# Patient Record
Sex: FEMALE | Race: WHITE | Employment: UNEMPLOYED | URBAN - METROPOLITAN AREA
[De-identification: names, ages, dates, MRNs, and addresses within clinical notes are randomized per-mention and may not be internally consistent; named-entity substitution may affect disease eponyms.]

---

## 2022-09-19 ENCOUNTER — HOSPITAL ENCOUNTER (EMERGENCY)
Age: 52
Discharge: ANOTHER HOSPITAL | End: 2022-09-20
Attending: STUDENT IN AN ORGANIZED HEALTH CARE EDUCATION/TRAINING PROGRAM
Payer: COMMERCIAL

## 2022-09-19 ENCOUNTER — APPOINTMENT (OUTPATIENT)
Dept: GENERAL RADIOLOGY | Age: 52
End: 2022-09-19
Payer: COMMERCIAL

## 2022-09-19 DIAGNOSIS — R73.9 HYPERGLYCEMIA: ICD-10-CM

## 2022-09-19 DIAGNOSIS — L02.511 ABSCESS OF RIGHT INDEX FINGER: Primary | ICD-10-CM

## 2022-09-19 LAB
A/G RATIO: 1 (ref 1.1–2.2)
ALBUMIN SERPL-MCNC: 4 G/DL (ref 3.4–5)
ALP BLD-CCNC: 177 U/L (ref 40–129)
ALT SERPL-CCNC: 27 U/L (ref 10–40)
ANION GAP SERPL CALCULATED.3IONS-SCNC: 12 MMOL/L (ref 3–16)
AST SERPL-CCNC: 23 U/L (ref 15–37)
BASOPHILS ABSOLUTE: 0.1 K/UL (ref 0–0.2)
BASOPHILS RELATIVE PERCENT: 0.6 %
BILIRUB SERPL-MCNC: <0.2 MG/DL (ref 0–1)
BUN BLDV-MCNC: 16 MG/DL (ref 7–20)
C-REACTIVE PROTEIN: 72 MG/L (ref 0–5.1)
CALCIUM SERPL-MCNC: 9.8 MG/DL (ref 8.3–10.6)
CHLORIDE BLD-SCNC: 94 MMOL/L (ref 99–110)
CO2: 26 MMOL/L (ref 21–32)
CREAT SERPL-MCNC: 0.6 MG/DL (ref 0.6–1.1)
EOSINOPHILS ABSOLUTE: 0.2 K/UL (ref 0–0.6)
EOSINOPHILS RELATIVE PERCENT: 1.3 %
GFR AFRICAN AMERICAN: >60
GFR NON-AFRICAN AMERICAN: >60
GLUCOSE BLD-MCNC: 415 MG/DL (ref 70–99)
HCT VFR BLD CALC: 45.6 % (ref 36–48)
HEMOGLOBIN: 15.9 G/DL (ref 12–16)
LACTIC ACID: 1.1 MMOL/L (ref 0.4–2)
LYMPHOCYTES ABSOLUTE: 1.6 K/UL (ref 1–5.1)
LYMPHOCYTES RELATIVE PERCENT: 12.6 %
MCH RBC QN AUTO: 30 PG (ref 26–34)
MCHC RBC AUTO-ENTMCNC: 34.8 G/DL (ref 31–36)
MCV RBC AUTO: 86.4 FL (ref 80–100)
MONOCYTES ABSOLUTE: 0.9 K/UL (ref 0–1.3)
MONOCYTES RELATIVE PERCENT: 7.3 %
NEUTROPHILS ABSOLUTE: 9.7 K/UL (ref 1.7–7.7)
NEUTROPHILS RELATIVE PERCENT: 78.2 %
PDW BLD-RTO: 13 % (ref 12.4–15.4)
PLATELET # BLD: 367 K/UL (ref 135–450)
PMV BLD AUTO: 6.8 FL (ref 5–10.5)
POTASSIUM REFLEX MAGNESIUM: 4.4 MMOL/L (ref 3.5–5.1)
RBC # BLD: 5.28 M/UL (ref 4–5.2)
SEDIMENTATION RATE, ERYTHROCYTE: 83 MM/HR (ref 0–30)
SODIUM BLD-SCNC: 132 MMOL/L (ref 136–145)
TOTAL PROTEIN: 8.2 G/DL (ref 6.4–8.2)
WBC # BLD: 12.4 K/UL (ref 4–11)

## 2022-09-19 PROCEDURE — 87040 BLOOD CULTURE FOR BACTERIA: CPT

## 2022-09-19 PROCEDURE — 96365 THER/PROPH/DIAG IV INF INIT: CPT

## 2022-09-19 PROCEDURE — 96366 THER/PROPH/DIAG IV INF ADDON: CPT

## 2022-09-19 PROCEDURE — 73140 X-RAY EXAM OF FINGER(S): CPT

## 2022-09-19 PROCEDURE — 86140 C-REACTIVE PROTEIN: CPT

## 2022-09-19 PROCEDURE — 85025 COMPLETE CBC W/AUTO DIFF WBC: CPT

## 2022-09-19 PROCEDURE — 96367 TX/PROPH/DG ADDL SEQ IV INF: CPT

## 2022-09-19 PROCEDURE — 6360000002 HC RX W HCPCS: Performed by: PHYSICIAN ASSISTANT

## 2022-09-19 PROCEDURE — 99285 EMERGENCY DEPT VISIT HI MDM: CPT

## 2022-09-19 PROCEDURE — 2580000003 HC RX 258: Performed by: PHYSICIAN ASSISTANT

## 2022-09-19 PROCEDURE — 36415 COLL VENOUS BLD VENIPUNCTURE: CPT

## 2022-09-19 PROCEDURE — 80053 COMPREHEN METABOLIC PANEL: CPT

## 2022-09-19 PROCEDURE — 96375 TX/PRO/DX INJ NEW DRUG ADDON: CPT

## 2022-09-19 PROCEDURE — 83605 ASSAY OF LACTIC ACID: CPT

## 2022-09-19 PROCEDURE — 96376 TX/PRO/DX INJ SAME DRUG ADON: CPT

## 2022-09-19 PROCEDURE — 85652 RBC SED RATE AUTOMATED: CPT

## 2022-09-19 RX ORDER — MORPHINE SULFATE 2 MG/ML
2 INJECTION, SOLUTION INTRAMUSCULAR; INTRAVENOUS ONCE
Status: COMPLETED | OUTPATIENT
Start: 2022-09-19 | End: 2022-09-19

## 2022-09-19 RX ORDER — HYDROCODONE BITARTRATE AND ACETAMINOPHEN 5; 325 MG/1; MG/1
1 TABLET ORAL EVERY 6 HOURS PRN
COMMUNITY

## 2022-09-19 RX ORDER — MORPHINE SULFATE 4 MG/ML
4 INJECTION, SOLUTION INTRAMUSCULAR; INTRAVENOUS ONCE
Status: COMPLETED | OUTPATIENT
Start: 2022-09-19 | End: 2022-09-19

## 2022-09-19 RX ORDER — ONDANSETRON 2 MG/ML
4 INJECTION INTRAMUSCULAR; INTRAVENOUS PRN
Status: COMPLETED | OUTPATIENT
Start: 2022-09-19 | End: 2022-09-19

## 2022-09-19 RX ORDER — 0.9 % SODIUM CHLORIDE 0.9 %
1000 INTRAVENOUS SOLUTION INTRAVENOUS ONCE
Status: COMPLETED | OUTPATIENT
Start: 2022-09-19 | End: 2022-09-19

## 2022-09-19 RX ORDER — DOXYCYCLINE HYCLATE 100 MG/1
100 CAPSULE ORAL 2 TIMES DAILY
COMMUNITY

## 2022-09-19 RX ADMIN — ONDANSETRON HYDROCHLORIDE 4 MG: 2 INJECTION, SOLUTION INTRAMUSCULAR; INTRAVENOUS at 22:23

## 2022-09-19 RX ADMIN — MORPHINE SULFATE 4 MG: 4 INJECTION, SOLUTION INTRAMUSCULAR; INTRAVENOUS at 22:23

## 2022-09-19 RX ADMIN — CEFTRIAXONE SODIUM 1000 MG: 1 INJECTION, POWDER, FOR SOLUTION INTRAMUSCULAR; INTRAVENOUS at 22:22

## 2022-09-19 RX ADMIN — VANCOMYCIN HYDROCHLORIDE 2000 MG: 1 INJECTION, POWDER, LYOPHILIZED, FOR SOLUTION INTRAVENOUS at 22:45

## 2022-09-19 RX ADMIN — MORPHINE SULFATE 2 MG: 2 INJECTION, SOLUTION INTRAMUSCULAR; INTRAVENOUS at 23:10

## 2022-09-19 RX ADMIN — SODIUM CHLORIDE 1000 ML: 9 INJECTION, SOLUTION INTRAVENOUS at 22:22

## 2022-09-19 ASSESSMENT — PAIN DESCRIPTION - PAIN TYPE: TYPE: ACUTE PAIN

## 2022-09-19 ASSESSMENT — PAIN DESCRIPTION - ONSET: ONSET: PROGRESSIVE

## 2022-09-19 ASSESSMENT — PAIN - FUNCTIONAL ASSESSMENT: PAIN_FUNCTIONAL_ASSESSMENT: 0-10

## 2022-09-19 ASSESSMENT — PAIN DESCRIPTION - ORIENTATION
ORIENTATION: RIGHT

## 2022-09-19 ASSESSMENT — PAIN DESCRIPTION - FREQUENCY: FREQUENCY: CONTINUOUS

## 2022-09-19 ASSESSMENT — PAIN SCALES - GENERAL
PAINLEVEL_OUTOF10: 7
PAINLEVEL_OUTOF10: 7
PAINLEVEL_OUTOF10: 4

## 2022-09-19 ASSESSMENT — PAIN DESCRIPTION - LOCATION
LOCATION: FINGER (COMMENT WHICH ONE)

## 2022-09-19 ASSESSMENT — PAIN DESCRIPTION - DESCRIPTORS: DESCRIPTORS: THROBBING

## 2022-09-20 VITALS
DIASTOLIC BLOOD PRESSURE: 71 MMHG | OXYGEN SATURATION: 92 % | BODY MASS INDEX: 28.63 KG/M2 | HEIGHT: 70 IN | RESPIRATION RATE: 16 BRPM | SYSTOLIC BLOOD PRESSURE: 141 MMHG | TEMPERATURE: 98.3 F | WEIGHT: 200 LBS | HEART RATE: 85 BPM

## 2022-09-20 PROCEDURE — 6360000002 HC RX W HCPCS: Performed by: PHYSICIAN ASSISTANT

## 2022-09-20 RX ADMIN — HYDROMORPHONE HYDROCHLORIDE 0.5 MG: 1 INJECTION, SOLUTION INTRAMUSCULAR; INTRAVENOUS; SUBCUTANEOUS at 01:13

## 2022-09-20 ASSESSMENT — PAIN SCALES - GENERAL: PAINLEVEL_OUTOF10: 5

## 2022-09-20 ASSESSMENT — PAIN DESCRIPTION - ORIENTATION: ORIENTATION: RIGHT

## 2022-09-20 ASSESSMENT — PAIN DESCRIPTION - LOCATION: LOCATION: FINGER (COMMENT WHICH ONE)

## 2022-09-20 NOTE — ED PROVIDER NOTES
[address] ED  EMERGENCY DEPARTMENT ENCOUNTER        Pt Name: Nelida Pal  MRN: [id number]  [place] 1970  Date of evaluation: 9/19/2022  Provider: SANG Devries  PCP: No primary care provider on file. This patient was seen and evaluated by the attending physician Michael Caldwell MD.      [address] [facility]       Chief Complaint   Patient presents with    Other     Pt states since last Friday has had signs of infection in right index finer. Writer notes swelling, redness, black coloration at tip of finger, discharge. Pt states painful. HISTORY OF PRESENT ILLNESS   (Location/Symptom, Timing/Onset, Context/Setting, Quality, Duration, Modifying Factors, Severity)  Note limiting factors. Nelida Pal is a 50 y.o. female with past medical history of rheumatoid arthritis not currently on any medication who presents via private vehicle from her home for evaluation of right index finger pain and swelling and infection. Patient notes that she started with some redness and warmth and suspected infection around the right index finger on September 9. She notes that the infection is just gotten progressively worse since it started. She presented to an outside urgent care this past Saturday and had localized incision and drainage and was started on doxycycline. Patient notes that ever since then she has had significantly progressively worsening swelling redness warmth and she notes that the tip of her finger is dark in color. Patient denies any fevers body aches chills nausea vomiting. She endorses significant pain to the finger, 10 out of 10. She is right-hand dominant. Denies past medical history of diabetes or other forms of immunocompromise. She works as a . Nursing Notes were all reviewed and agreed with or any disagreements were addressed  in the HPI. Pt was seen during the [place] 19 pandemic.  Appropriate PPE worn by ME during patient encounters. Pt seen during a time with constrained hospital bed capacity and other potential inpatient and outpatient resources were constrained due to the viral pandemic. REVIEW OF SYSTEMS    (2-9 systems for level 4, 10 or more for level 5)     Review of Systems    Positives and Pertinent negatives as per HPI. Except as noted abovein the ROS, all other systems were reviewed and negative. PAST MEDICAL HISTORY   History reviewed. No pertinent past medical history. SURGICAL HISTORY     Past Surgical History:   Procedure Laterality Date    APPENDECTOMY      CHOLECYSTECTOMY           CURRENTMEDICATIONS       Previous Medications    DOXYCYCLINE HYCLATE (VIBRAMYCIN) 100 MG CAPSULE    Take 100 mg by mouth 2 times daily    HYDROCODONE-ACETAMINOPHEN (NORCO) 5-325 MG PER TABLET    Take 1 tablet by mouth every 6 hours as needed for Pain. ALLERGIES     Penicillins    FAMILYHISTORY     History reviewed. No pertinent family history. SOCIAL HISTORY       Social History     Socioeconomic History    Marital status:      Spouse name: None    Number of children: None    Years of education: None    Highest education level: None   Tobacco Use    Smoking status: Every Day     Packs/day: 1.00     Types: Cigarettes    Smokeless tobacco: Never   Substance and Sexual Activity    Alcohol use: Not Currently    Drug use: Not Currently       SCREENINGS    Leida Coma Scale  Eye Opening: Spontaneous  Best Verbal Response: Oriented  Best Motor Response: Obeys commands  Leida Coma Scale Score: 15        PHYSICAL EXAM    (up to 7 for level 4, 8 or more for level 5)     ED Triage Vitals [09/19/22 2104]   BP Temp Temp Source Heart Rate Resp SpO2 Height Weight   (!) 178/100 98.3 °F (36.8 °C) Oral (!) 103 16 98 % 5' 10\" (1.778 m) 200 lb (90.7 kg)       Physical Exam  Vitals and nursing note reviewed. Constitutional:       General: She is awake. She is in acute distress. Appearance: She is well-developed. She is not ill-appearing, toxic-appearing or diaphoretic. HENT:      Head: Normocephalic and atraumatic. Right Ear: External ear normal.      Left Ear: External ear normal.      Nose: Nose normal.      Mouth/Throat:      Mouth: Mucous membranes are moist.      Pharynx: Oropharynx is clear. Eyes:      General:         Right eye: No discharge. Left eye: No discharge. Extraocular Movements: Extraocular movements intact. Conjunctiva/sclera: Conjunctivae normal.      Pupils: Pupils are equal, round, and reactive to light. Cardiovascular:      Rate and Rhythm: Regular rhythm. Tachycardia present. Pulses: Normal pulses. Heart sounds: Normal heart sounds. No murmur heard. No friction rub. No gallop. Pulmonary:      Effort: Pulmonary effort is normal. No respiratory distress. Breath sounds: Normal breath sounds. No wheezing or rales. Musculoskeletal:      Cervical back: Normal range of motion and neck supple. No rigidity. Comments: Inspection of the right upper extremity reveals significant soft tissue deformity, there is circumferential swelling of the right index finger that extends from the middle of the fingernail through the DIP and PIP and all through the proximal phalanx. Patient is in a fixed extended position, she is able to partially flex at the MCP no more than 45 degrees. Patient has no active range of motion at the PIP or DIP. The very tip of the fingertip is black, with necrosis and it is cool to touch with decreased sensation. There is mild lymphangitic streaking down the palmar aspect of the hand. Lymphadenopathy:      Cervical: No cervical adenopathy. Skin:     General: Skin is warm and dry. Capillary Refill: Capillary refill takes 2 to 3 seconds. Neurological:      General: No focal deficit present. Mental Status: She is alert, oriented to person, place, and time and easily aroused. Cranial Nerves: No cranial nerve deficit. Psychiatric:         Behavior: Behavior normal. Behavior is cooperative. DIAGNOSTIC RESULTS   LABS:    Labs Reviewed   CBC WITH AUTO DIFFERENTIAL - Abnormal; Notable for the following components:       Result Value    WBC 12.4 (*)     RBC 5.28 (*)     Neutrophils Absolute 9.7 (*)     All other components within normal limits   COMPREHENSIVE METABOLIC PANEL W/ REFLEX TO MG FOR LOW K - Abnormal; Notable for the following components:    Sodium 132 (*)     Chloride 94 (*)     Glucose 415 (*)     Albumin/Globulin Ratio 1.0 (*)     Alkaline Phosphatase 177 (*)     All other components within normal limits   SEDIMENTATION RATE - Abnormal; Notable for the following components:    Sed Rate 83 (*)     All other components within normal limits   C-REACTIVE PROTEIN - Abnormal; Notable for the following components:    CRP 72.0 (*)     All other components within normal limits   CULTURE, BLOOD 1   CULTURE, BLOOD 2   LACTIC ACID       All other labs were within normal range or not returned as of this dictation. EKG: All EKG's are interpreted by the Emergency Department Physician who either signs orCo-signs this chart in the absence of a cardiologist.  Please see their note for interpretation of EKG. RADIOLOGY:   Non-plain film images such as CT, Ultrasound and MRI are read by the radiologist. Plain radiographic images are visualized andpreliminarily interpreted by the  ED Provider with the below findings:        Interpretation perthe Radiologist below, if available at the time of this note:    XR FINGER RIGHT (MIN 2 VIEWS)   Final Result   Soft tissue swelling though no soft tissue gas, foreign body or osseous   fracture. No results found. PROCEDURES   Unless otherwise noted below, none     Procedures    CRITICAL CARE TIME   I personally saw the patient and independently provided 46 minutes of non-concurrent critical care out of the total shared critical care time provided.  This excludes time spent doing separately billable procedures. This includes time at the bedside, data interpretation, medication management, obtaining critical history from collateral sources if the patient is unable to provide it directly, and physician consultation. Specifics of interventions taken and potentially life-threatening diagnostic considerations are listed above in the medical decision making. If this was a shared visit with a Physician the time in this attestation is non-concurrent critical care time out of the total shared critical care time provided by the Physician and myself. CONSULTS:  IP CONSULT TO ORTHOPEDIC SURGERY      EMERGENCY DEPARTMENT COURSE and DIFFERENTIALDIAGNOSIS/MDM:   Vitals:    Vitals:    09/19/22 2107 09/19/22 2225 09/19/22 2239 09/20/22 0002   BP:  (!) 162/88 (!) 162/88 (!) 149/82   Pulse: 99  89    Resp:   16    Temp:       TempSrc:       SpO2:  98% 96% 95%   Weight:       Height:           Patient was given thefollowing medications:  Medications   vancomycin (VANCOCIN) 2,000 mg in dextrose 5 % 500 mL IVPB (2,000 mg IntraVENous New Bag 9/19/22 2245)   cefTRIAXone (ROCEPHIN) 1,000 mg in dextrose 5 % 50 mL IVPB mini-bag (0 mg IntraVENous Stopped 9/19/22 2245)   0.9 % sodium chloride bolus (0 mLs IntraVENous Stopped 9/19/22 2353)   morphine sulfate (PF) injection 4 mg (4 mg IntraVENous Given 9/19/22 2223)   ondansetron (ZOFRAN) injection 4 mg (4 mg IntraVENous Given 9/19/22 2223)   morphine (PF) injection 2 mg (2 mg IntraVENous Given 9/19/22 2310)       PDMP Monitoring:    Last PDMP Pop as Reviewed [facility]):  Review User Review Instant Review Result            Urine Drug Screenings (1 yr)    No resulted procedures found. Medication Contract and Consent for Opioid Use Documents Filed        No documents found                    MDM:   Patient seen and evaluated. Old records reviewed. Diagnostic testing reviewed and results discussed.       Patient is a 82-year-old female who presents for evaluation of right index finger infection. Patient seen and evaluated by myself. Patient was promptly started on IV antibiotics, IV fluids, CBC CMP blood cultures and x-ray obtained. Orthopedics was consulted stat, per Dr. Joshua Zepeda patient will need to be transferred to facility with hand surgery coverage. Patient has acute leukocytosis, she has initial tachycardia she is meeting SIRS criteria however she has no acute lactic acidosis not meeting criteria for severe sepsis or septic shock. I believe that the initial tachycardia may have been pain related as patient is afebrile and other vital signs are reassuring in the department, I have no concern for acute septicemia or bacteremia. Patient has no sign of soft tissue gas in the department on x-ray. Patient has elevated inflammatory markers including sed rate and CRP. I spoke with Ortho hand who will see the patient in the ER at [facility]. Additionally, patient is found to be significantly hyperglycemic in the department however she has no anion gap I have no concern for DKA however patient may have underlying undiagnosed diabetes, recommend continued monitoring. Patient getting IV fluids in the department and is n.p.o., insulin not administered. I spoke with Dr. Everette Salas [facility] and Dr. Tylor Mendoza ER attending. We thoroughly discussed the history, physical exam, laboratory and imaging studies, as well as, current course of treatment within the emergency department. Based upon that discussion, we've decided to transfer Marielena Souza to [facility] ER, for further observation and evaluation of Alida Brice current condition. As I have deemed necessary from their history, physical, and studies, I have considered and evaluated Marielean Souza for the following diagnoses:      CLINICAL IMPRESSION:  1. Abscess of right index finger    2.  Hyperglycemia        BP (!) 149/82   Pulse 89   Temp 98.3 °F (36.8 °C) (Oral)   Resp 16   Ht 5' 10\" (1.778 m) Wt 200 lb (90.7 kg)   LMP 09/12/2022 (Approximate)   SpO2 95%   BMI 28.70 kg/m²         Is this patient to be included in the SEP-1 Core Measure due to severe sepsis or septic shock? No   Exclusion criteria - the patient is NOT to be included for SEP-1 Core Measure due to: Alternative explanation for abnormal labs/vitals that do not relate to sepsis, see MDM for further explanation        FINAL IMPRESSION      1. Abscess of right index finger    2. Hyperglycemia          DISPOSITION/PLAN   DISPOSITION Decision To Transfer 09/20/2022 12:54:37 AM      PATIENT REFERREDTO:  No follow-up provider specified.     DISCHARGE MEDICATIONS:  New Prescriptions    No medications on file       DISCONTINUED MEDICATIONS:  Discontinued Medications    No medications on file              (Please note that portions ofthis note were completed with a voice recognition program.  Efforts were made to edit the dictations but occasionally words are mis-transcribed.)    SANG Jung (electronically signed)       SANG Jung  09/20/22 [address]115 [address]  09/20/22 0107 [place]

## 2022-09-20 NOTE — ED NOTES
2231- Call to the [facility] Sreekanth Dominguez RN) requested to have [facility] paged     Opal Almanza  09/19/22 0604
3399  Hand surgery consult completed by Dr. Aravind Bradford  09/19/22 9161
Report given to Raghav Dunbar RN at [facility] ED at this time.       Sony Campbell RN  09/20/22 0811
mariam,girl

## 2022-09-23 LAB — BLOOD CULTURE, ROUTINE: NORMAL

## 2024-12-02 ENCOUNTER — HOSPITAL ENCOUNTER (EMERGENCY)
Age: 54
Discharge: HOME | End: 2024-12-02
Payer: COMMERCIAL

## 2024-12-02 ENCOUNTER — APPOINTMENT (OUTPATIENT)
Dept: GENERAL RADIOLOGY | Age: 54
End: 2024-12-02
Payer: COMMERCIAL

## 2024-12-02 VITALS
TEMPERATURE: 97.8 F | HEIGHT: 70 IN | DIASTOLIC BLOOD PRESSURE: 85 MMHG | WEIGHT: 215 LBS | RESPIRATION RATE: 16 BRPM | OXYGEN SATURATION: 97 % | SYSTOLIC BLOOD PRESSURE: 165 MMHG | BODY MASS INDEX: 30.78 KG/M2 | HEART RATE: 95 BPM

## 2024-12-02 DIAGNOSIS — L84 FOOT CALLUS: Primary | ICD-10-CM

## 2024-12-02 DIAGNOSIS — T14.8XXA BLOOD BLISTER: ICD-10-CM

## 2024-12-02 LAB
ALBUMIN SERPL-MCNC: 3.6 G/DL (ref 3.4–5)
ALBUMIN/GLOB SERPL: 1 {RATIO} (ref 1.1–2.2)
ALP SERPL-CCNC: 113 U/L (ref 40–129)
ALT SERPL-CCNC: 12 U/L (ref 10–40)
ANION GAP SERPL CALCULATED.3IONS-SCNC: 11 MMOL/L (ref 3–16)
AST SERPL-CCNC: 16 U/L (ref 15–37)
BASOPHILS # BLD: 0.1 K/UL (ref 0–0.2)
BASOPHILS NFR BLD: 1 %
BILIRUB SERPL-MCNC: <0.2 MG/DL (ref 0–1)
BUN SERPL-MCNC: 20 MG/DL (ref 7–20)
CALCIUM SERPL-MCNC: 9.6 MG/DL (ref 8.3–10.6)
CHLORIDE SERPL-SCNC: 100 MMOL/L (ref 99–110)
CO2 SERPL-SCNC: 26 MMOL/L (ref 21–32)
CREAT SERPL-MCNC: 0.7 MG/DL (ref 0.6–1.1)
DEPRECATED RDW RBC AUTO: 13.3 % (ref 12.4–15.4)
EOSINOPHIL # BLD: 0.3 K/UL (ref 0–0.6)
EOSINOPHIL NFR BLD: 3.5 %
GFR SERPLBLD CREATININE-BSD FMLA CKD-EPI: >90 ML/MIN/{1.73_M2}
GLUCOSE SERPL-MCNC: 250 MG/DL (ref 70–99)
HCT VFR BLD AUTO: 44 % (ref 36–48)
HGB BLD-MCNC: 15.6 G/DL (ref 12–16)
LACTATE BLDV-SCNC: 1.7 MMOL/L (ref 0.4–1.9)
LYMPHOCYTES # BLD: 2.7 K/UL (ref 1–5.1)
LYMPHOCYTES NFR BLD: 28.1 %
MCH RBC QN AUTO: 31.6 PG (ref 26–34)
MCHC RBC AUTO-ENTMCNC: 35.5 G/DL (ref 31–36)
MCV RBC AUTO: 89 FL (ref 80–100)
MONOCYTES # BLD: 0.5 K/UL (ref 0–1.3)
MONOCYTES NFR BLD: 5.8 %
NEUTROPHILS # BLD: 5.8 K/UL (ref 1.7–7.7)
NEUTROPHILS NFR BLD: 61.6 %
PLATELET # BLD AUTO: 242 K/UL (ref 135–450)
PMV BLD AUTO: 7.4 FL (ref 5–10.5)
POTASSIUM SERPL-SCNC: 4.2 MMOL/L (ref 3.5–5.1)
PROCALCITONIN SERPL IA-MCNC: 0.02 NG/ML (ref 0–0.15)
PROT SERPL-MCNC: 7.1 G/DL (ref 6.4–8.2)
RBC # BLD AUTO: 4.94 M/UL (ref 4–5.2)
SODIUM SERPL-SCNC: 137 MMOL/L (ref 136–145)
WBC # BLD AUTO: 9.5 K/UL (ref 4–11)

## 2024-12-02 PROCEDURE — 83605 ASSAY OF LACTIC ACID: CPT

## 2024-12-02 PROCEDURE — 99285 EMERGENCY DEPT VISIT HI MDM: CPT

## 2024-12-02 PROCEDURE — 87040 BLOOD CULTURE FOR BACTERIA: CPT

## 2024-12-02 PROCEDURE — 85025 COMPLETE CBC W/AUTO DIFF WBC: CPT

## 2024-12-02 PROCEDURE — 93005 ELECTROCARDIOGRAM TRACING: CPT

## 2024-12-02 PROCEDURE — 73630 X-RAY EXAM OF FOOT: CPT

## 2024-12-02 PROCEDURE — 71045 X-RAY EXAM CHEST 1 VIEW: CPT

## 2024-12-02 PROCEDURE — 84145 PROCALCITONIN (PCT): CPT

## 2024-12-02 PROCEDURE — 80053 COMPREHEN METABOLIC PANEL: CPT

## 2024-12-02 RX ORDER — ATORVASTATIN CALCIUM 20 MG/1
20 TABLET, FILM COATED ORAL NIGHTLY
COMMUNITY

## 2024-12-02 RX ORDER — METFORMIN HYDROCHLORIDE 500 MG/1
TABLET, EXTENDED RELEASE ORAL
COMMUNITY
Start: 2024-09-27

## 2024-12-02 RX ORDER — ACETAMINOPHEN 500 MG
1000 TABLET ORAL 4 TIMES DAILY PRN
Qty: 360 TABLET | Refills: 1 | Status: SHIPPED | OUTPATIENT
Start: 2024-12-02

## 2024-12-02 RX ORDER — NAPROXEN 500 MG/1
500 TABLET ORAL 2 TIMES DAILY WITH MEALS
Qty: 60 TABLET | Refills: 0 | Status: SHIPPED | OUTPATIENT
Start: 2024-12-02

## 2024-12-02 RX ORDER — DULAGLUTIDE 1.5 MG/.5ML
INJECTION, SOLUTION SUBCUTANEOUS
COMMUNITY
Start: 2024-09-26

## 2024-12-02 RX ORDER — MULTIVIT-MIN/FOLIC ACID/HRB293 80MCG-66.7
1 TABLET,CHEWABLE ORAL DAILY
COMMUNITY

## 2024-12-02 RX ORDER — CITALOPRAM HYDROBROMIDE 20 MG/1
20 TABLET ORAL DAILY
COMMUNITY
Start: 2024-09-27

## 2024-12-02 ASSESSMENT — LIFESTYLE VARIABLES
HOW OFTEN DO YOU HAVE A DRINK CONTAINING ALCOHOL: MONTHLY OR LESS
HOW MANY STANDARD DRINKS CONTAINING ALCOHOL DO YOU HAVE ON A TYPICAL DAY: 1 OR 2

## 2024-12-02 ASSESSMENT — ENCOUNTER SYMPTOMS
BACK PAIN: 0
NAUSEA: 0
ABDOMINAL PAIN: 0
VOMITING: 0

## 2024-12-02 ASSESSMENT — PAIN - FUNCTIONAL ASSESSMENT: PAIN_FUNCTIONAL_ASSESSMENT: NONE - DENIES PAIN

## 2024-12-02 NOTE — DISCHARGE INSTRUCTIONS
There is no evidence of bone infection/osteomyelitis on your x-ray.  There is no degenerative changes to the bones which would necessarily be concern for osteosarcoma.  There is no definitive abscess or cyst formation.  I did speak with podiatry regarding your emergency department visit.  They will see you in the clinic as an outpatient.  Your blood work does not show concerns for potential infection or believe that you need an antibiotic.    I did send extra strength Tylenol and naproxen to your pharmacy for management of pain.   and take as directed.  Contact number for podiatry listed above.    Follow-up with primary care as needed.    Please return if worse.

## 2024-12-02 NOTE — ED PROVIDER NOTES
[facility] ED  EMERGENCY DEPARTMENT ENCOUNTER        Pt Name: Alida Brice  MRN: [id number]  Birthdate 1970  Date of evaluation: 12/2/2024  Provider: SEFERINO Amaya CNP  PCP: No primary care provider on file.  Note Started: 4:14 PM EST 12/2/24      DEAN. I have evaluated this patient.        CHIEF COMPLAINT       Chief Complaint   Patient presents with    Wound Check     Patient states she thinks she has a blister on her right foot, patient is a diabetic. Patient states she discovered it on Wednesday        HISTORY OF PRESENT ILLNESS: 1 or more Elements     History From: Patient, EMR review    Chief Complaint: Wound to the right foot    Alida Brice is a 54 y.o. female who presents to the emergency department for evaluation of a wound to the bottom of the right foot.  States that she first noted this area of concern on Wednesday, November 25.  Has not appreciated any active bleeding or drainage.  Does have a history of type 2 diabetes but states that her blood sugars have been running appropriately.  Denies any subjective or objective fevers, chills, nausea, vomiting.  Not currently connected with podiatry.    Nursing Notes were all reviewed and agreed with or any disagreements were addressed in the HPI.    REVIEW OF SYSTEMS :      Review of Systems   Constitutional:  Negative for chills, fatigue and fever.   Gastrointestinal:  Negative for abdominal pain, nausea and vomiting.   Musculoskeletal:  Negative for back pain and myalgias.   Skin:  Positive for wound.   Allergic/Immunologic: Positive for immunocompromised state (T2DM).   Psychiatric/Behavioral:  Negative for confusion.    All other systems reviewed and are negative.      Positives and Pertinent negatives as per HPI.     SURGICAL HISTORY     Past Surgical History:   Procedure Laterality Date    APPENDECTOMY      CHOLECYSTECTOMY         CURRENTMEDICATIONS       Discharge Medication List as of 12/2/2024  5:14 PM

## 2024-12-02 NOTE — ED TRIAGE NOTES
Patient states she thinks she has a blister on her right foot, patient is a diabetic. Patient states she discovered it on Wednesday

## 2024-12-02 NOTE — ED NOTES
1619 - Perfect Serve sent to Dr. Acevedo.  1625 - Dr. Acevedo returned call, call given to Alejandrina Dozier NP

## 2024-12-03 LAB
EKG ATRIAL RATE: 92 BPM
EKG DIAGNOSIS: NORMAL
EKG P AXIS: 69 DEGREES
EKG P-R INTERVAL: 164 MS
EKG Q-T INTERVAL: 360 MS
EKG QRS DURATION: 92 MS
EKG QTC CALCULATION (BAZETT): 445 MS
EKG R AXIS: 5 DEGREES
EKG T AXIS: 47 DEGREES
EKG VENTRICULAR RATE: 92 BPM

## 2024-12-03 PROCEDURE — 93010 ELECTROCARDIOGRAM REPORT: CPT | Performed by: INTERNAL MEDICINE

## 2024-12-06 LAB — BACTERIA BLD CULT: NORMAL
